# Patient Record
Sex: FEMALE | Race: WHITE | NOT HISPANIC OR LATINO | Employment: UNEMPLOYED | URBAN - NONMETROPOLITAN AREA
[De-identification: names, ages, dates, MRNs, and addresses within clinical notes are randomized per-mention and may not be internally consistent; named-entity substitution may affect disease eponyms.]

---

## 2024-02-26 ENCOUNTER — TELEPHONE (OUTPATIENT)
Dept: CARDIOLOGY | Facility: CLINIC | Age: 24
End: 2024-02-26
Payer: COMMERCIAL

## 2024-02-26 NOTE — TELEPHONE ENCOUNTER
"Relay     \"Calling to remind you of your appointment with Benji Vital on Wednesday 02/28/2024. Benji is now located in the basement. You will drive around and park in the back of the building, come in the door, and turn right. Please bring your insurance cards, ID, and an updated medication list with you Wednesday. If you've had any blood work or been in the hospital or ER, we need to know when and where so we can obtain those records. Thank you \"        "

## 2024-03-06 ENCOUNTER — TELEPHONE (OUTPATIENT)
Dept: CARDIOLOGY | Facility: CLINIC | Age: 24
End: 2024-03-06
Payer: COMMERCIAL